# Patient Record
Sex: MALE | Race: BLACK OR AFRICAN AMERICAN | NOT HISPANIC OR LATINO | Employment: FULL TIME | URBAN - METROPOLITAN AREA
[De-identification: names, ages, dates, MRNs, and addresses within clinical notes are randomized per-mention and may not be internally consistent; named-entity substitution may affect disease eponyms.]

---

## 2023-08-19 ENCOUNTER — HOSPITAL ENCOUNTER (EMERGENCY)
Facility: HOSPITAL | Age: 23
Discharge: HOME | End: 2023-08-19
Attending: EMERGENCY MEDICINE
Payer: COMMERCIAL

## 2023-08-19 VITALS
WEIGHT: 186.51 LBS | OXYGEN SATURATION: 100 % | SYSTOLIC BLOOD PRESSURE: 106 MMHG | TEMPERATURE: 97.2 F | RESPIRATION RATE: 20 BRPM | DIASTOLIC BLOOD PRESSURE: 74 MMHG | HEART RATE: 73 BPM
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
## 2023-08-19 DIAGNOSIS — H72.91 TYMPANIC MEMBRANE RUPTURE, RIGHT: ICD-10-CM

## 2023-08-19 PROCEDURE — 99283 EMERGENCY DEPT VISIT LOW MDM: CPT | Performed by: EMERGENCY MEDICINE

## 2023-08-19 PROCEDURE — 99284 EMERGENCY DEPT VISIT MOD MDM: CPT

## 2023-08-19 NOTE — DISCHARGE INSTRUCTIONS
Avoid getting your right ear wet. Do not go swimming. We recommend no flights for the next few weeks. We have provided information to follow-up with ear nose and throat for reassessment. If you have any severe worsening of pain, severe headache, persistent nausea and vomiting, persistent bleeding from your ear, return to the emergency department.
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
## 2023-08-19 NOTE — Clinical Note
Eder Guzmán was seen and treated in our emergency department on 8/19/2023. Diagnosis:     Lew  . He may return on this date: 08/22/2023         If you have any questions or concerns, please don't hesitate to call.       Desire Hummel MD    ______________________________           _______________          _______________  Hospital Representative                              Date                                Time